# Patient Record
Sex: MALE | Race: WHITE | NOT HISPANIC OR LATINO | Employment: FULL TIME | URBAN - METROPOLITAN AREA
[De-identification: names, ages, dates, MRNs, and addresses within clinical notes are randomized per-mention and may not be internally consistent; named-entity substitution may affect disease eponyms.]

---

## 2021-07-01 ENCOUNTER — APPOINTMENT (OUTPATIENT)
Dept: GENERAL RADIOLOGY | Facility: HOSPITAL | Age: 61
End: 2021-07-01

## 2021-07-01 ENCOUNTER — HOSPITAL ENCOUNTER (EMERGENCY)
Facility: HOSPITAL | Age: 61
Discharge: HOME | End: 2021-07-01
Attending: EMERGENCY MEDICINE | Admitting: EMERGENCY MEDICINE

## 2021-07-01 VITALS
SYSTOLIC BLOOD PRESSURE: 132 MMHG | TEMPERATURE: 98 F | BODY MASS INDEX: 30.55 KG/M2 | HEIGHT: 70 IN | HEART RATE: 78 BPM | OXYGEN SATURATION: 99 % | RESPIRATION RATE: 16 BRPM | WEIGHT: 213.41 LBS | DIASTOLIC BLOOD PRESSURE: 74 MMHG

## 2021-07-01 DIAGNOSIS — S86.911A MUSCLE STRAIN OF RIGHT LOWER LEG, INITIAL ENCOUNTER: Primary | ICD-10-CM

## 2021-07-01 PROCEDURE — 96372 THER/PROPH/DIAG INJ SC/IM: CPT

## 2021-07-01 PROCEDURE — 73600 X-RAY EXAM OF ANKLE: CPT

## 2021-07-01 PROCEDURE — 99283 EMERGENCY DEPT VISIT LOW MDM: CPT

## 2021-07-01 PROCEDURE — 25010000002 KETOROLAC TROMETHAMINE PER 15 MG: Performed by: NURSE PRACTITIONER

## 2021-07-01 PROCEDURE — 73590 X-RAY EXAM OF LOWER LEG: CPT

## 2021-07-01 RX ORDER — KETOROLAC TROMETHAMINE 30 MG/ML
60 INJECTION, SOLUTION INTRAMUSCULAR; INTRAVENOUS ONCE
Status: COMPLETED | OUTPATIENT
Start: 2021-07-01 | End: 2021-07-01

## 2021-07-01 RX ADMIN — KETOROLAC TROMETHAMINE 60 MG: 60 INJECTION, SOLUTION INTRAMUSCULAR at 21:39

## 2021-07-02 NOTE — DISCHARGE INSTRUCTIONS
As discussed, please mention to your primary care provider the incidental finding of arterial calcifications on your lower right extremity x-ray today determine if further diagnostic work-up is needed.

## 2021-07-02 NOTE — ED PROVIDER NOTES
Subjective   Patient reports that he was on an obstacle course yesterday when he attempted to jump 6 feet from a log to a cargo net.  His right leg got caught in the cargo net and he fell backwards causing his right leg to become entangled in the cargo net.  He is currently training cadets and states that he went to the medical facility there and was advised to come to the emergency department for imaging because they do not have that capability.  He reports pain and swelling of the right lower extremity (calf area).      History provided by:  Patient   used: No        Review of Systems   Musculoskeletal: Positive for myalgias.        Right lower extremity pain and swelling at the calf.   All other systems reviewed and are negative.      History reviewed. No pertinent past medical history.    No Known Allergies    History reviewed. No pertinent surgical history.    History reviewed. No pertinent family history.    Social History     Socioeconomic History   • Marital status:      Spouse name: Not on file   • Number of children: Not on file   • Years of education: Not on file   • Highest education level: Not on file           Objective   Physical Exam  Vitals and nursing note reviewed.   Constitutional:       Appearance: Normal appearance. He is normal weight.   HENT:      Head: Normocephalic and atraumatic.   Eyes:      Pupils: Pupils are equal, round, and reactive to light.   Cardiovascular:      Rate and Rhythm: Normal rate.      Pulses: Normal pulses.   Pulmonary:      Effort: Pulmonary effort is normal.   Musculoskeletal:         General: Swelling (Right lower extremity - calf), tenderness (Right lower extremity - calf) and signs of injury (Right lower extremity -calf) present. No deformity. Normal range of motion.      Cervical back: Normal range of motion and neck supple.        Feet:    Feet:      Right foot:      Skin integrity: Skin integrity normal. No skin breakdown, erythema or  warmth.      Comments: Dorsalis pedis and posterior tibial on the right foot palpable.  Mild swelling noted to the medial right ankle as indicated.  Skin:     General: Skin is warm and dry.      Capillary Refill: Capillary refill takes less than 2 seconds.   Neurological:      General: No focal deficit present.      Mental Status: He is alert and oriented to person, place, and time.      Sensory: Sensation is intact.      Motor: Motor function is intact.   Psychiatric:         Mood and Affect: Mood normal.         Behavior: Behavior normal.         Thought Content: Thought content normal.         Judgment: Judgment normal.         Procedures           ED Course  ED Course as of Jul 02 0132 Fri Jul 02, 2021 0130 Patient declines pain medication and acknowledges understanding of what symptoms to look for and when he would need to return to the emergency department if needed if his swelling was to progress.    [MH]      ED Course User Index  [MH] Toya Dickens APRN                                           MDM  Number of Diagnoses or Management Options  Muscle strain of right lower leg, initial encounter: new and requires workup     Amount and/or Complexity of Data Reviewed  Tests in the radiology section of CPT®: reviewed and ordered    Patient Progress  Patient progress: stable      Final diagnoses:   Muscle strain of right lower leg, initial encounter       ED Disposition  ED Disposition     ED Disposition Condition Comment    Discharge Stable           Provider, No Known  [facility]  [place] [id number]      Follow-up with your PCM as discussed if the pain and swelling does not subside         Medication List      No changes were made to your prescriptions during this visit.          Toya Dickens APRN  07/02/21 0132